# Patient Record
Sex: FEMALE | Race: WHITE
[De-identification: names, ages, dates, MRNs, and addresses within clinical notes are randomized per-mention and may not be internally consistent; named-entity substitution may affect disease eponyms.]

---

## 2018-01-05 ENCOUNTER — HOSPITAL ENCOUNTER (OUTPATIENT)
Dept: HOSPITAL 95 - ORSCSDS | Age: 73
Discharge: HOME | End: 2018-01-05
Attending: PODIATRIST
Payer: MEDICARE

## 2018-01-05 VITALS — WEIGHT: 179.61 LBS | BODY MASS INDEX: 28.87 KG/M2 | HEIGHT: 65.98 IN

## 2018-01-05 DIAGNOSIS — I48.91: ICD-10-CM

## 2018-01-05 DIAGNOSIS — I10: ICD-10-CM

## 2018-01-05 DIAGNOSIS — E03.9: ICD-10-CM

## 2018-01-05 DIAGNOSIS — Z96.9: ICD-10-CM

## 2018-01-05 DIAGNOSIS — Z79.899: ICD-10-CM

## 2018-01-05 DIAGNOSIS — Z87.891: ICD-10-CM

## 2018-01-05 DIAGNOSIS — M20.12: Primary | ICD-10-CM

## 2018-01-05 DIAGNOSIS — N18.9: ICD-10-CM

## 2018-01-05 PROCEDURE — 0QSP04Z REPOSITION LEFT METATARSAL WITH INTERNAL FIXATION DEVICE, OPEN APPROACH: ICD-10-PCS | Performed by: PODIATRIST

## 2018-01-05 PROCEDURE — C1713 ANCHOR/SCREW BN/BN,TIS/BN: HCPCS

## 2018-01-05 PROCEDURE — 0QPP04Z REMOVAL OF INTERNAL FIXATION DEVICE FROM LEFT METATARSAL, OPEN APPROACH: ICD-10-PCS | Performed by: PODIATRIST

## 2018-01-05 PROCEDURE — C1769 GUIDE WIRE: HCPCS

## 2018-06-11 ENCOUNTER — HOSPITAL ENCOUNTER (OUTPATIENT)
Dept: HOSPITAL 95 - LAB SHORT | Age: 73
Discharge: HOME | End: 2018-06-11
Attending: SPECIALIST
Payer: MEDICARE

## 2018-06-11 DIAGNOSIS — R94.5: ICD-10-CM

## 2018-06-11 DIAGNOSIS — E55.9: ICD-10-CM

## 2018-06-11 DIAGNOSIS — E78.00: ICD-10-CM

## 2018-06-11 DIAGNOSIS — R76.9: ICD-10-CM

## 2018-06-11 DIAGNOSIS — D50.9: ICD-10-CM

## 2018-06-11 DIAGNOSIS — R94.6: ICD-10-CM

## 2018-06-11 DIAGNOSIS — D63.1: ICD-10-CM

## 2018-06-11 DIAGNOSIS — D51.8: ICD-10-CM

## 2018-06-11 DIAGNOSIS — N25.81: ICD-10-CM

## 2018-06-11 DIAGNOSIS — D52.8: ICD-10-CM

## 2018-06-11 DIAGNOSIS — N18.3: Primary | ICD-10-CM

## 2018-06-11 LAB
MICROALBUMIN 24H UR-MCNC: 13.2 MG/L (ref 0–20)
PROT UR-MCNC: 9.6 MG/DL (ref 0–11.9)

## 2021-05-11 ENCOUNTER — HOSPITAL ENCOUNTER (INPATIENT)
Dept: HOSPITAL 95 - ER | Age: 76
LOS: 2 days | Discharge: HOME | DRG: 872 | End: 2021-05-13
Attending: HOSPITALIST | Admitting: HOSPITALIST
Payer: MEDICARE

## 2021-05-11 VITALS — BODY MASS INDEX: 28.12 KG/M2 | HEIGHT: 66 IN | WEIGHT: 175 LBS

## 2021-05-11 DIAGNOSIS — Z98.890: ICD-10-CM

## 2021-05-11 DIAGNOSIS — Z79.899: ICD-10-CM

## 2021-05-11 DIAGNOSIS — Z90.710: ICD-10-CM

## 2021-05-11 DIAGNOSIS — I48.20: ICD-10-CM

## 2021-05-11 DIAGNOSIS — L03.114: ICD-10-CM

## 2021-05-11 DIAGNOSIS — Z88.8: ICD-10-CM

## 2021-05-11 DIAGNOSIS — K21.9: ICD-10-CM

## 2021-05-11 DIAGNOSIS — N18.30: ICD-10-CM

## 2021-05-11 DIAGNOSIS — M17.0: ICD-10-CM

## 2021-05-11 DIAGNOSIS — A41.9: Primary | ICD-10-CM

## 2021-05-11 DIAGNOSIS — Z90.89: ICD-10-CM

## 2021-05-11 DIAGNOSIS — E03.9: ICD-10-CM

## 2021-05-11 DIAGNOSIS — Z79.01: ICD-10-CM

## 2021-05-11 DIAGNOSIS — Z87.11: ICD-10-CM

## 2021-05-11 DIAGNOSIS — M11.232: ICD-10-CM

## 2021-05-11 LAB
ALBUMIN SERPL BCP-MCNC: 3.5 G/DL (ref 3.4–5)
ALBUMIN/GLOB SERPL: 0.8 {RATIO} (ref 0.8–1.8)
ALT SERPL W P-5'-P-CCNC: 26 U/L (ref 12–78)
ANION GAP SERPL CALCULATED.4IONS-SCNC: 6 MMOL/L (ref 6–16)
AST SERPL W P-5'-P-CCNC: 15 U/L (ref 12–37)
BASOPHILS # BLD AUTO: 0.04 K/MM3 (ref 0–0.23)
BASOPHILS NFR BLD AUTO: 0 % (ref 0–2)
BILIRUB SERPL-MCNC: 0.9 MG/DL (ref 0.1–1)
BUN SERPL-MCNC: 14 MG/DL (ref 8–24)
CALCIUM SERPL-MCNC: 9.2 MG/DL (ref 8.5–10.1)
CHLORIDE SERPL-SCNC: 105 MMOL/L (ref 98–108)
CO2 SERPL-SCNC: 28 MMOL/L (ref 21–32)
CREAT SERPL-MCNC: 1.15 MG/DL (ref 0.4–1)
CRP SERPL-MCNC: 13.1 MG/DL (ref 0–0.3)
DEPRECATED RDW RBC AUTO: 44.4 FL (ref 35.1–46.3)
EOSINOPHIL # BLD AUTO: 0.17 K/MM3 (ref 0–0.68)
EOSINOPHIL NFR BLD AUTO: 2 % (ref 0–6)
ERYTHROCYTE [DISTWIDTH] IN BLOOD BY AUTOMATED COUNT: 15.7 % (ref 11.7–14.2)
GLOBULIN SER CALC-MCNC: 4.4 G/DL (ref 2.2–4)
GLUCOSE SERPL-MCNC: 133 MG/DL (ref 70–99)
HCT VFR BLD AUTO: 39.9 % (ref 33–51)
HGB BLD-MCNC: 13.5 G/DL (ref 11.5–16)
IMM GRANULOCYTES # BLD AUTO: 0.05 K/MM3 (ref 0–0.1)
IMM GRANULOCYTES NFR BLD AUTO: 1 % (ref 0–1)
LEUKOCYTE ESTERASE UR QL STRIP: (no result)
LYMPHOCYTES # BLD AUTO: 1.61 K/MM3 (ref 0.84–5.2)
LYMPHOCYTES NFR BLD AUTO: 17 % (ref 21–46)
MCHC RBC AUTO-ENTMCNC: 33.8 G/DL (ref 31.5–36.5)
MCV RBC AUTO: 82 FL (ref 80–100)
MONOCYTES # BLD AUTO: 1.27 K/MM3 (ref 0.16–1.47)
MONOCYTES NFR BLD AUTO: 13 % (ref 4–13)
NEUTROPHILS # BLD AUTO: 6.59 K/MM3 (ref 1.96–9.15)
NEUTROPHILS NFR BLD AUTO: 68 % (ref 41–73)
NRBC # BLD AUTO: 0 K/MM3 (ref 0–0.02)
NRBC BLD AUTO-RTO: 0 /100 WBC (ref 0–0.2)
PLATELET # BLD AUTO: 162 K/MM3 (ref 150–400)
POTASSIUM SERPL-SCNC: 3.6 MMOL/L (ref 3.5–5.5)
PROT SERPL-MCNC: 7.9 G/DL (ref 6.4–8.2)
PROT UR STRIP-MCNC: (no result) MG/DL
RBC #/AREA URNS HPF: (no result) /HPF (ref 0–2)
SODIUM SERPL-SCNC: 139 MMOL/L (ref 136–145)
SP GR SPEC: 1.02 (ref 1–1.02)
UROBILINOGEN UR STRIP-MCNC: (no result) MG/DL
WBC #/AREA URNS HPF: (no result) /HPF (ref 0–5)

## 2021-05-11 PROCEDURE — A9270 NON-COVERED ITEM OR SERVICE: HCPCS

## 2021-05-12 NOTE — NUR
SHIFT SUMMARY
PT A&OX4, VSS, L WRIST CELLULITIS, RED/WARM/EDEMA IMPROVED, WIGGLES FINGERS,
ABLE TO MAKE A FIST NOW, DENIES N&T IN ALL EXT'S. DENIES NEED FOR PAIN
MEDICATIONS. JAVID PO. AMBULATING IND IN ROOM, TO BRP. VOIDING WELL. REPORT
PROVIDED TO CAT RN.

## 2021-05-12 NOTE — NUR
SHIFT SUMMARY
PT CAME IN FROM ED AT 0300 THIS MORNING. ALERT AND ORIENTED 3-4 WITH DAUGHTER
YARA AT BEDSIDE. PT IS ADMITTED FOR L WRIST CELLULITIS WITH REDNESS, WARM TO
TOUCH AND SWOLLEN. PT CAME IN WITH VANCOMYCIN INFUSING, STARTED TO FEEL WARM
AND REDNESS ON FACE APPEARED. LIDA DAWSON KEITH DR. BECERRA FOR BENADRYL.
ADMINSTERED BENADRYL VIA IV. 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 PT HAS IV ON R
HAND, SALINE LOCKED. IV ABX ADMINISTERED. PT REPORTS NO APPETITE IN THE PAST 2
DAYS. DENIES NAUSEA AND VOMITING. TOLERATING PO. HX AFIB. PT ON TELE, AFIB AT
96 PER [employer] TECH. PT DENIES NUMBNESS AND TINGLING SENSATION BUT HAD A
HARD TIME MOVING LEFT SIDE FINGERS. PT SLEPT GOOD AFTER THE INITIAL
ASSESSMENT. PT REPORTS PAIN, MEDICATED WITH TYLENOL. CALL LIGHT WITHIN REACH.
WILL PROVIDE REPORT TO RN NURSE.

## 2021-05-13 LAB
ALBUMIN SERPL BCP-MCNC: 2.8 G/DL (ref 3.4–5)
ANION GAP SERPL CALCULATED.4IONS-SCNC: 7 MMOL/L (ref 6–16)
BASOPHILS # BLD AUTO: 0.05 K/MM3 (ref 0–0.23)
BASOPHILS NFR BLD AUTO: 1 % (ref 0–2)
BUN SERPL-MCNC: 22 MG/DL (ref 8–24)
CALCIUM SERPL-MCNC: 8.8 MG/DL (ref 8.5–10.1)
CHLORIDE SERPL-SCNC: 106 MMOL/L (ref 98–108)
CO2 SERPL-SCNC: 28 MMOL/L (ref 21–32)
CREAT SERPL-MCNC: 1.21 MG/DL (ref 0.4–1)
DEPRECATED RDW RBC AUTO: 44.3 FL (ref 35.1–46.3)
EOSINOPHIL # BLD AUTO: 0.25 K/MM3 (ref 0–0.68)
EOSINOPHIL NFR BLD AUTO: 4 % (ref 0–6)
ERYTHROCYTE [DISTWIDTH] IN BLOOD BY AUTOMATED COUNT: 15.4 % (ref 11.7–14.2)
GLUCOSE SERPL-MCNC: 129 MG/DL (ref 70–99)
HCT VFR BLD AUTO: 35.8 % (ref 33–51)
HGB BLD-MCNC: 12.1 G/DL (ref 11.5–16)
IMM GRANULOCYTES # BLD AUTO: 0.04 K/MM3 (ref 0–0.1)
IMM GRANULOCYTES NFR BLD AUTO: 1 % (ref 0–1)
LYMPHOCYTES # BLD AUTO: 1.4 K/MM3 (ref 0.84–5.2)
LYMPHOCYTES NFR BLD AUTO: 21 % (ref 21–46)
MCHC RBC AUTO-ENTMCNC: 33.8 G/DL (ref 31.5–36.5)
MCV RBC AUTO: 83 FL (ref 80–100)
MONOCYTES # BLD AUTO: 0.73 K/MM3 (ref 0.16–1.47)
MONOCYTES NFR BLD AUTO: 11 % (ref 4–13)
NEUTROPHILS # BLD AUTO: 4.36 K/MM3 (ref 1.96–9.15)
NEUTROPHILS NFR BLD AUTO: 64 % (ref 41–73)
NRBC # BLD AUTO: 0 K/MM3 (ref 0–0.02)
NRBC BLD AUTO-RTO: 0 /100 WBC (ref 0–0.2)
PHOSPHATE SERPL-MCNC: 4 MG/DL (ref 2.5–4.9)
PLATELET # BLD AUTO: 145 K/MM3 (ref 150–400)
POTASSIUM SERPL-SCNC: 3.3 MMOL/L (ref 3.5–5.5)
SODIUM SERPL-SCNC: 141 MMOL/L (ref 136–145)

## 2021-05-13 NOTE — NUR
SHIFT SUMMARY
PT AOX4. ADMITTED FOR CELLULITS ON LEFT WRIST. L WRIST REDNESS AND SWELLING IS
IMPROVING. PT REPORTS MIN PAIN. PAIN MANAGED WITH TYLENOL. ABX ADMINISTERED
X3. VOIDING ADEQUATELY DENIES ANY ISSUES BUT HAS BEEN HAVING DARKER YELLOW
URINE. ENC PATIENT TO DRINK MORE FLUIDS. PT TOLERATION PO INTAKE. DENIES
NAUSEA AND VOMITING. INDEPENDET IN ROOM. CALL LIGHT WITHIN REACH. WILL PROVIDE
REPORT TO ONCOMING NURSE.

## 2021-05-13 NOTE — NUR
DISCHARGE SUMMARY
PT ALERT AND ORIENTED THROUGHOUT MORNING SHIFT. TOLERATIG REGULAR DIET.
INDEPENDENT IN THE ROOM. VOIDING WELL. SWELLING AND REDNESS TO LEFT HAND AND
WRIST GREATLY DIMINISHED. DENIES PAIN. DISCHARGE ORDERS OBTAINED. IV DC'D WNL.
PT TOLERATED WELL. TELE DC'D. DISCHARGE EDUCATION GIVEN ON FOLLOW UP
PRECAUTIONS, FOLLOW UP APPOINTMENTS, AND DISCHARGE MEDS. PT LEFT UNIT VIA
WHEELCHAIR FOR HOME AT 1250.

## 2021-05-24 ENCOUNTER — HOSPITAL ENCOUNTER (EMERGENCY)
Dept: HOSPITAL 95 - ER | Age: 76
Discharge: HOME | End: 2021-05-24
Payer: MEDICARE

## 2021-05-24 VITALS — BODY MASS INDEX: 27.32 KG/M2 | HEIGHT: 66 IN | WEIGHT: 170 LBS

## 2021-05-24 DIAGNOSIS — Z88.8: ICD-10-CM

## 2021-05-24 DIAGNOSIS — Z79.01: ICD-10-CM

## 2021-05-24 DIAGNOSIS — Z88.1: ICD-10-CM

## 2021-05-24 DIAGNOSIS — I48.91: ICD-10-CM

## 2021-05-24 DIAGNOSIS — L03.114: Primary | ICD-10-CM

## 2021-05-24 DIAGNOSIS — Z87.891: ICD-10-CM

## 2021-10-18 ENCOUNTER — HOSPITAL ENCOUNTER (OUTPATIENT)
Dept: HOSPITAL 95 - ORSCSDS | Age: 76
Discharge: HOME | End: 2021-10-18
Attending: ORTHOPAEDIC SURGERY
Payer: MEDICARE

## 2021-10-18 ENCOUNTER — HOSPITAL ENCOUNTER (OUTPATIENT)
Dept: HOSPITAL 95 - PCU | Age: 76
Setting detail: OBSERVATION
LOS: 1 days | Discharge: HOME | End: 2021-10-19
Attending: INTERNAL MEDICINE | Admitting: NURSE PRACTITIONER
Payer: MEDICARE

## 2021-10-18 VITALS — HEIGHT: 66 IN | WEIGHT: 183.87 LBS | BODY MASS INDEX: 29.55 KG/M2

## 2021-10-18 VITALS — HEIGHT: 66 IN | BODY MASS INDEX: 10.1 KG/M2 | WEIGHT: 62.83 LBS

## 2021-10-18 DIAGNOSIS — E03.9: ICD-10-CM

## 2021-10-18 DIAGNOSIS — Z79.899: ICD-10-CM

## 2021-10-18 DIAGNOSIS — Z79.01: ICD-10-CM

## 2021-10-18 DIAGNOSIS — I12.9: ICD-10-CM
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
## 2021-10-18 DIAGNOSIS — I10: ICD-10-CM

## 2021-10-18 DIAGNOSIS — Z88.1: ICD-10-CM

## 2021-10-18 DIAGNOSIS — E78.1: ICD-10-CM

## 2021-10-18 DIAGNOSIS — K21.9: ICD-10-CM

## 2021-10-18 DIAGNOSIS — Z87.891: ICD-10-CM

## 2021-10-18 DIAGNOSIS — I97.191: Primary | ICD-10-CM

## 2021-10-18 DIAGNOSIS — N18.30: ICD-10-CM

## 2021-10-18 DIAGNOSIS — E89.0: ICD-10-CM

## 2021-10-18 DIAGNOSIS — E66.01: ICD-10-CM

## 2021-10-18 DIAGNOSIS — Z88.8: ICD-10-CM

## 2021-10-18 DIAGNOSIS — M18.12: Primary | ICD-10-CM

## 2021-10-18 DIAGNOSIS — I48.91: ICD-10-CM

## 2021-10-18 DIAGNOSIS — I48.20: ICD-10-CM

## 2021-10-18 LAB
ALBUMIN SERPL BCP-MCNC: 3.3 G/DL (ref 3.4–5)
ALBUMIN/GLOB SERPL: 0.9 {RATIO} (ref 0.8–1.8)
ALT SERPL W P-5'-P-CCNC: 41 U/L (ref 12–78)
ANION GAP SERPL CALCULATED.4IONS-SCNC: 8 MMOL/L (ref 6–16)
AST SERPL W P-5'-P-CCNC: 32 U/L (ref 12–37)
BASOPHILS # BLD AUTO: 0.05 K/MM3 (ref 0–0.23)
BASOPHILS NFR BLD AUTO: 1 % (ref 0–2)
BILIRUB SERPL-MCNC: 0.3 MG/DL (ref 0.1–1)
BUN SERPL-MCNC: 20 MG/DL (ref 8–24)
CALCIUM SERPL-MCNC: 8.6 MG/DL (ref 8.5–10.1)
CHLORIDE SERPL-SCNC: 104 MMOL/L (ref 98–108)
CO2 SERPL-SCNC: 28 MMOL/L (ref 21–32)
CREAT SERPL-MCNC: 1.04 MG/DL (ref 0.4–1)
DEPRECATED RDW RBC AUTO: 42.6 FL (ref 35.1–46.3)
EOSINOPHIL # BLD AUTO: 0.08 K/MM3 (ref 0–0.68)
EOSINOPHIL NFR BLD AUTO: 1 % (ref 0–6)
ERYTHROCYTE [DISTWIDTH] IN BLOOD BY AUTOMATED COUNT: 13.5 % (ref 11.7–14.2)
GLOBULIN SER CALC-MCNC: 3.8 G/DL (ref 2.2–4)
GLUCOSE SERPL-MCNC: 161 MG/DL (ref 70–99)
HCT VFR BLD AUTO: 39 % (ref 33–51)
HGB BLD-MCNC: 13.1 G/DL (ref 11.5–16)
IMM GRANULOCYTES # BLD AUTO: 0.07 K/MM3 (ref 0–0.1)
IMM GRANULOCYTES NFR BLD AUTO: 1 % (ref 0–1)
LYMPHOCYTES # BLD AUTO: 1.28 K/MM3 (ref 0.84–5.2)
LYMPHOCYTES NFR BLD AUTO: 15 % (ref 21–46)
MAGNESIUM SERPL-MCNC: 1.7 MG/DL (ref 1.6–2.4)
MCHC RBC AUTO-ENTMCNC: 33.6 G/DL (ref 31.5–36.5)
MCV RBC AUTO: 86 FL (ref 80–100)
MONOCYTES # BLD AUTO: 0.31 K/MM3 (ref 0.16–1.47)
MONOCYTES NFR BLD AUTO: 4 % (ref 4–13)
NEUTROPHILS # BLD AUTO: 6.67 K/MM3 (ref 1.96–9.15)
NEUTROPHILS NFR BLD AUTO: 79 % (ref 41–73)
NRBC # BLD AUTO: 0 K/MM3 (ref 0–0.02)
NRBC BLD AUTO-RTO: 0 /100 WBC (ref 0–0.2)
PLATELET # BLD AUTO: 160 K/MM3 (ref 150–400)
POTASSIUM SERPL-SCNC: 3.4 MMOL/L (ref 3.5–5.5)
PROT SERPL-MCNC: 7.1 G/DL (ref 6.4–8.2)
SODIUM SERPL-SCNC: 140 MMOL/L (ref 136–145)
TROPONIN I SERPL-MCNC: <0.015 NG/ML (ref 0–0.04)

## 2021-10-18 PROCEDURE — G0378 HOSPITAL OBSERVATION PER HR: HCPCS

## 2021-10-18 PROCEDURE — 0RQT0ZZ REPAIR LEFT CARPOMETACARPAL JOINT, OPEN APPROACH: ICD-10-PCS | Performed by: ORTHOPAEDIC SURGERY

## 2021-10-18 PROCEDURE — 0LX80ZZ TRANSFER LEFT HAND TENDON, OPEN APPROACH: ICD-10-PCS | Performed by: ORTHOPAEDIC SURGERY

## 2021-10-18 PROCEDURE — A9270 NON-COVERED ITEM OR SERVICE: HCPCS

## 2021-10-18 NOTE — NUR
10/18/21 1158 Chico Hinds AT BEDSIDE ORDERING FOR CARDIOLOGY TO BE CALLED AND HAVE
CONSULT OR APPOINTMENT TO BE MADE ASAP OR WITHIN A WEEK DUE TO
TACHYCARDIA.  PATIENT PUT ONTO 3 LEAD SHOWING SINUS TACHYCARDIA
RANGING FROM 135-159 BPM WITH ANESTHESIA INFORMED AND OBSERVING VS AND
LEAD RYTHM.  PAR STAFF INFORMED TO KEEP PATIENT IN PAR FOR
OBERSERVATION UNTIL HEART RATE STAYS CONSISTENTLY UNDER 100BPM.

## 2021-10-18 NOTE — NUR
CARE ASSUMPTION/ARRIVAL TO PCU
 
PATIENT ARRIVED FROM SURGERY CENTER VIA GURNEY AND TRANSFERED TO PCU BED VIA
SLIDER SHEET. ARRIVED ON 10L NRB, AND WE SWITCHED TO 2L NC AND PATIENT O2 SATS
>90%. TELE AFIB . VSS. SWITCHED TO RA AND SPO2 >90%. LUNG SOUNDS CLEAR.
NO CHEST PAIN OR SOB. THUMB PAIN RATED AT 3. ICE PROVIDED AND ELEVATED. CALL
LIGHT WITHIN REACH AND BED IN LOWEST POSITION. WILL CONTINUE TO MONITOR AND
PROVIDE CARE.

## 2021-10-18 NOTE — NUR
SHIFT SUMMARY
 
PATIENT A.OX4. VSS. AFIB . SPO2 >90% ON 2L NC. PATIENT STARTED TO DESAT
WHEN SLEEPING SO PUT O2 BACK ON. CARDIZEM INFUSING AT A RATE OF 5. NO ACUTE
CHANGES. WILL CONTINUE TO MONITOR AND PROVIDE CARE UNTIL HAND OFF WITH NEXT
SHIFT.

## 2021-10-18 NOTE — NUR
10/18/21 1218 Chico Hinds 10MG GIVEN BY MOUTH PER  FOR TACHYCARDIA.  SAMPLE
OF MEDICATION RECEIVED BY [facility].  THIS IS PATIENTS HOME
REGIMEN DOSE PER PT.

## 2021-10-19 LAB
ALBUMIN SERPL BCP-MCNC: 2.9 G/DL (ref 3.4–5)
ALBUMIN/GLOB SERPL: 0.8 {RATIO} (ref 0.8–1.8)
ALT SERPL W P-5'-P-CCNC: 35 U/L (ref 12–78)
ANION GAP SERPL CALCULATED.4IONS-SCNC: 8 MMOL/L (ref 6–16)
AST SERPL W P-5'-P-CCNC: 24 U/L (ref 12–37)
BASOPHILS # BLD AUTO: 0.03 K/MM3 (ref 0–0.23)
BASOPHILS NFR BLD AUTO: 0 % (ref 0–2)
BILIRUB SERPL-MCNC: 0.4 MG/DL (ref 0.1–1)
BUN SERPL-MCNC: 29 MG/DL (ref 8–24)
CALCIUM SERPL-MCNC: 8.5 MG/DL (ref 8.5–10.1)
CHLORIDE SERPL-SCNC: 103 MMOL/L (ref 98–108)
CO2 SERPL-SCNC: 28 MMOL/L (ref 21–32)
CREAT SERPL-MCNC: 1.18 MG/DL (ref 0.4–1)
DEPRECATED RDW RBC AUTO: 42.7 FL (ref 35.1–46.3)
EOSINOPHIL # BLD AUTO: 0 K/MM3 (ref 0–0.68)
EOSINOPHIL NFR BLD AUTO: 0 % (ref 0–6)
ERYTHROCYTE [DISTWIDTH] IN BLOOD BY AUTOMATED COUNT: 13.8 % (ref 11.7–14.2)
GLOBULIN SER CALC-MCNC: 3.6 G/DL (ref 2.2–4)
GLUCOSE SERPL-MCNC: 184 MG/DL (ref 70–99)
HCT VFR BLD AUTO: 34.8 % (ref 33–51)
HGB BLD-MCNC: 11.8 G/DL (ref 11.5–16)
IMM GRANULOCYTES # BLD AUTO: 0.17 K/MM3 (ref 0–0.1)
IMM GRANULOCYTES NFR BLD AUTO: 1 % (ref 0–1)
LYMPHOCYTES # BLD AUTO: 1.26 K/MM3 (ref 0.84–5.2)
LYMPHOCYTES NFR BLD AUTO: 9 % (ref 21–46)
MCHC RBC AUTO-ENTMCNC: 33.9 G/DL (ref 31.5–36.5)
MCV RBC AUTO: 86 FL (ref 80–100)
MONOCYTES # BLD AUTO: 1.02 K/MM3 (ref 0.16–1.47)
MONOCYTES NFR BLD AUTO: 8 % (ref 4–13)
NEUTROPHILS # BLD AUTO: 11.01 K/MM3 (ref 1.96–9.15)
NEUTROPHILS NFR BLD AUTO: 82 % (ref 41–73)
NRBC # BLD AUTO: 0 K/MM3 (ref 0–0.02)
NRBC BLD AUTO-RTO: 0 /100 WBC (ref 0–0.2)
PLATELET # BLD AUTO: 152 K/MM3 (ref 150–400)
POTASSIUM SERPL-SCNC: 3.6 MMOL/L (ref 3.5–5.5)
PROT SERPL-MCNC: 6.5 G/DL (ref 6.4–8.2)
SODIUM SERPL-SCNC: 139 MMOL/L (ref 136–145)
TROPONIN I SERPL-MCNC: <0.015 NG/ML (ref 0–0.04)

## 2021-10-19 NOTE — NUR
SHIFT SUMMARY
 
PT A/OX4, APPROPRIATE WITH STAFF. ON TELE AFIB 90'S-100'S. ON 1-2L NC TO
MAINTAIN O2 ABOVE 90% WHEN SLEEPING DOES DESAT. HAS HER LEFT ARM ON A SLING,
WITH ACE WRAP WITH NO DRAINAGE NOTED. ELEVATED LEFT ARM ON PILLOWS AND PUT ICE
BAG AROUND ARM. BUE AND BLE SKIN WARM T/O TO TOUCH. ABLE TO MOVE FINGERS ON
BOTH HANDS. PT DID HAVE A LOW GRADE FEVER AND SOMCE DISCOMFORT AND MEDICATED
PER EMAR/ORDERS. VSS. PT WAS HAVING SOME ACID RELUX; HOSPITALIST NOTIFIED AND
ORDERS WERE GIVEN; PT MORE COMFORTABLE. PT CONT. TO BEDSIDE COMMODE WITH ONE
PERSON ASSIST. BED IN LOWEST POSITION, CALL LIGHT W/I REACH, AND BED ALARM ON.
GAVE REPORT TO NELLI NG RN.

## 2021-10-19 NOTE — NUR
PT DISCHARGED TO HOME WITH DISCHARGE ORDERS PT DISCLOSED POST OP INSTRUCTIONS
FROM YESTERDAY'S DC PAPPER FROM DAY SURGERY. PT VERBALIZED UNDERSTANDING. PT
TO CONTINUE SAME HOME MEDS REGIMEN. PT SENT HOME WITH PAIN MEDS HARD SCRIPT.
DRESSING ON LEFT ARM INTACT WITH SLING ON. NO OTHER ISSUES REPORTED PRIOR TO
DISCHARGE. PT ACCOMPANIED BY NATIONAL GUARD AT THE ER ENTRANCE FOR TRANSPORT,
ALL BELONGINGS SENT WITH THE PT.

## 2021-10-19 NOTE — NUR
CARE ASSUMPTION
 
PATIENT A/OX4. VSS. SPO2 >90% ON RA. TELE AFIB 70-90S. NO CHEST PAIN, PAIN, OR
SOB. MD IN TO SEE PATIENT AND PATIENT WILL DISCHARGE TODAY. CALL LIGHT WITHIN
REACH. WILL CONTINUE TO MONITOR AND PROVIDE CARE.

## 2021-11-04 ENCOUNTER — HOSPITAL ENCOUNTER (OUTPATIENT)
Dept: HOSPITAL 95 - ORSCSDS | Age: 76
Discharge: HOME | End: 2021-11-04
Attending: INTERNAL MEDICINE
Payer: MEDICARE

## 2021-11-04 VITALS — BODY MASS INDEX: 28.13 KG/M2 | HEIGHT: 66 IN | WEIGHT: 175.05 LBS

## 2021-11-04 DIAGNOSIS — K44.9: ICD-10-CM

## 2021-11-04 DIAGNOSIS — R19.5: ICD-10-CM

## 2021-11-04 DIAGNOSIS — D50.0: Primary | ICD-10-CM

## 2021-11-04 DIAGNOSIS — K76.0: ICD-10-CM

## 2021-11-04 DIAGNOSIS — Z86.010: ICD-10-CM

## 2021-11-04 DIAGNOSIS — Z87.891: ICD-10-CM

## 2021-11-04 DIAGNOSIS — K64.8: ICD-10-CM

## 2021-11-04 DIAGNOSIS — E03.9: ICD-10-CM

## 2021-11-04 DIAGNOSIS — I48.91: ICD-10-CM

## 2021-11-04 DIAGNOSIS — Z79.01: ICD-10-CM

## 2021-11-04 DIAGNOSIS — K31.7: ICD-10-CM

## 2021-11-04 DIAGNOSIS — K57.30: ICD-10-CM

## 2021-11-04 DIAGNOSIS — Z79.899: ICD-10-CM

## 2021-11-04 NOTE — NUR
11/04/21 1445 Toya Molina (Rosa
DELAYED ENTRY
 
ONCE IN RECOVERY, PT DESATURATED TO 88% ON ROOM AIR. WHEN INSTRUCTED
TO DEEP BREATHE, 02 INCREASED TO 90-94%. UNLESS PT WAS CONSCIOUSLY
DEEP BREATHING, OXYGEN WOULD DROP TO 88%. PT CONTINUES TO DENY ANY
DISCOMFORT, DIFFICULTY BREATHING OR NEED TO CLEAR LUNGS. LUNGS CLEAR
TO AUSCULTATION, PT DOES NOT APPEAR IN DISTRESS, NO LABORED BREATHING.
DR. PACE NOTIFIED OF DECREASED O2 SAT.
 
1255: DR. PACE CALLED PT'S PCP
1300: DR. PACE CALLED ED REGARDING POSSIBLE TX FOR CHEST XRAY
 
PT ADAMANTLY DOES NOT WANT TO REPORT TO ED; STS SHE "LIVES LIKE THIS"
AND "FEELS FINE". DR. PACE CLEARED PT FOR PO INTAKE. PT TOLERATING
JUICE WITHOUT ISSUES. PT O2 GRADUALLY INCREASING WITHOUT FOCUSED DEEP
BREATHING, SATURATION NOW RANGING 93-96% ON ROOM AIR. PT & DR. PACE
IN AGREEMENT PT IS OKAY TO DISCHARGE HOME.
 
1325: DR. PACE CLEARED PT TO DISCHARGE HOME ON ROOM AIR.

## 2022-12-01 ENCOUNTER — HOSPITAL ENCOUNTER (OUTPATIENT)
Dept: HOSPITAL 95 - LAB | Age: 77
End: 2022-12-01
Attending: OTOLARYNGOLOGY
Payer: MEDICARE

## 2022-12-01 DIAGNOSIS — L72.0: Primary | ICD-10-CM

## 2022-12-01 DIAGNOSIS — L57.8: ICD-10-CM

## 2023-07-19 ENCOUNTER — HOSPITAL ENCOUNTER (INPATIENT)
Dept: HOSPITAL 95 - ER | Age: 78
LOS: 6 days | Discharge: HOME | DRG: 871 | End: 2023-07-25
Attending: HOSPITALIST | Admitting: FAMILY MEDICINE
Payer: MEDICARE

## 2023-07-19 VITALS — SYSTOLIC BLOOD PRESSURE: 129 MMHG | DIASTOLIC BLOOD PRESSURE: 74 MMHG

## 2023-07-19 VITALS — SYSTOLIC BLOOD PRESSURE: 126 MMHG | DIASTOLIC BLOOD PRESSURE: 73 MMHG

## 2023-07-19 VITALS — HEIGHT: 66 IN | WEIGHT: 175.27 LBS | BODY MASS INDEX: 28.17 KG/M2

## 2023-07-19 DIAGNOSIS — K76.0: ICD-10-CM

## 2023-07-19 DIAGNOSIS — Z79.899: ICD-10-CM

## 2023-07-19 DIAGNOSIS — Z79.891: ICD-10-CM

## 2023-07-19 DIAGNOSIS — K21.9: ICD-10-CM

## 2023-07-19 DIAGNOSIS — Z88.1: ICD-10-CM

## 2023-07-19 DIAGNOSIS — Z79.01: ICD-10-CM

## 2023-07-19 DIAGNOSIS — A41.59: Primary | ICD-10-CM

## 2023-07-19 DIAGNOSIS — I48.91: ICD-10-CM

## 2023-07-19 DIAGNOSIS — D63.1: ICD-10-CM

## 2023-07-19 DIAGNOSIS — Z88.8: ICD-10-CM

## 2023-07-19 DIAGNOSIS — E89.0: ICD-10-CM

## 2023-07-19 DIAGNOSIS — K64.9: ICD-10-CM

## 2023-07-19 DIAGNOSIS — Z90.89: ICD-10-CM

## 2023-07-19 DIAGNOSIS — E83.42: ICD-10-CM

## 2023-07-19 DIAGNOSIS — D69.6: ICD-10-CM

## 2023-07-19 DIAGNOSIS — G92.8: ICD-10-CM

## 2023-07-19 DIAGNOSIS — E78.1: ICD-10-CM

## 2023-07-19 DIAGNOSIS — Z90.710: ICD-10-CM

## 2023-07-19 DIAGNOSIS — E87.6: ICD-10-CM

## 2023-07-19 DIAGNOSIS — M19.90: ICD-10-CM

## 2023-07-19 DIAGNOSIS — E80.6: ICD-10-CM

## 2023-07-19 DIAGNOSIS — I10: ICD-10-CM

## 2023-07-19 DIAGNOSIS — Z79.890: ICD-10-CM

## 2023-07-19 DIAGNOSIS — R74.01: ICD-10-CM

## 2023-07-19 DIAGNOSIS — Z98.890: ICD-10-CM

## 2023-07-19 DIAGNOSIS — K44.9: ICD-10-CM

## 2023-07-19 DIAGNOSIS — E87.20: ICD-10-CM

## 2023-07-19 DIAGNOSIS — K83.09: ICD-10-CM

## 2023-07-19 DIAGNOSIS — M10.9: ICD-10-CM

## 2023-07-19 DIAGNOSIS — Z90.49: ICD-10-CM

## 2023-07-19 DIAGNOSIS — K57.30: ICD-10-CM

## 2023-07-19 LAB
ALBUMIN SERPL BCP-MCNC: 3.4 G/DL (ref 3.4–5)
ALBUMIN/GLOB SERPL: 0.9 {RATIO} (ref 0.8–1.8)
ALT SERPL W P-5'-P-CCNC: 566 U/L (ref 12–78)
ANION GAP SERPL CALCULATED.4IONS-SCNC: 14 MMOL/L (ref 6–16)
AST SERPL W P-5'-P-CCNC: 761 U/L (ref 12–37)
BASOPHILS # BLD AUTO: 0.04 K/MM3 (ref 0–0.23)
BASOPHILS NFR BLD AUTO: 0 % (ref 0–2)
BILIRUB SERPL-MCNC: 4.1 MG/DL (ref 0.1–1)
BUN SERPL-MCNC: 20 MG/DL (ref 8–24)
CALCIUM SERPL-MCNC: 9.1 MG/DL (ref 8.5–10.1)
CHLORIDE SERPL-SCNC: 108 MMOL/L (ref 98–108)
CO2 SERPL-SCNC: 21 MMOL/L (ref 21–32)
CREAT SERPL-MCNC: 0.98 MG/DL (ref 0.4–1)
DEPRECATED RDW RBC AUTO: 44.8 FL (ref 35.1–46.3)
EOSINOPHIL # BLD AUTO: 0.01 K/MM3 (ref 0–0.68)
EOSINOPHIL NFR BLD AUTO: 0 % (ref 0–6)
ERYTHROCYTE [DISTWIDTH] IN BLOOD BY AUTOMATED COUNT: 14.8 % (ref 11.7–14.2)
GLOBULIN SER CALC-MCNC: 3.8 G/DL (ref 2.2–4)
GLUCOSE SERPL-MCNC: 170 MG/DL (ref 70–99)
HCT VFR BLD AUTO: 36.7 % (ref 33–51)
HGB BLD-MCNC: 12.4 G/DL (ref 11.5–16)
IMM GRANULOCYTES # BLD AUTO: 0.1 K/MM3 (ref 0–0.1)
IMM GRANULOCYTES NFR BLD AUTO: 1 % (ref 0–1)
LYMPHOCYTES # BLD AUTO: 0.4 K/MM3 (ref 0.84–5.2)
LYMPHOCYTES NFR BLD AUTO: 3 % (ref 21–46)
MAGNESIUM SERPL-MCNC: 1.4 MG/DL (ref 1.6–2.4)
MCHC RBC AUTO-ENTMCNC: 33.8 G/DL (ref 31.5–36.5)
MCV RBC AUTO: 83 FL (ref 80–100)
MONOCYTES # BLD AUTO: 0.87 K/MM3 (ref 0.16–1.47)
MONOCYTES NFR BLD AUTO: 7 % (ref 4–13)
NEUTROPHILS # BLD AUTO: 11.91 K/MM3 (ref 1.96–9.15)
NEUTROPHILS NFR BLD AUTO: 89 % (ref 41–73)
NRBC # BLD AUTO: 0 K/MM3 (ref 0–0.02)
NRBC BLD AUTO-RTO: 0 /100 WBC (ref 0–0.2)
PLATELET # BLD AUTO: 133 K/MM3 (ref 150–400)
POTASSIUM SERPL-SCNC: 3.1 MMOL/L (ref 3.5–5.5)
PROT SERPL-MCNC: 7.2 G/DL (ref 6.4–8.2)
PROT UR STRIP-MCNC: (no result) MG/DL
PROTHROMBIN TIME: 12.4 SEC (ref 9.7–11.5)
RBC #/AREA URNS HPF: (no result) /HPF (ref 0–2)
SODIUM SERPL-SCNC: 143 MMOL/L (ref 136–145)
SP GR SPEC: 1.01 (ref 1–1.02)
UROBILINOGEN UR STRIP-MCNC: (no result) MG/DL
WBC #/AREA URNS HPF: (no result) /HPF (ref 0–5)

## 2023-07-19 PROCEDURE — A9270 NON-COVERED ITEM OR SERVICE: HCPCS

## 2023-07-19 PROCEDURE — C9113 INJ PANTOPRAZOLE SODIUM, VIA: HCPCS

## 2023-07-20 VITALS — DIASTOLIC BLOOD PRESSURE: 79 MMHG | SYSTOLIC BLOOD PRESSURE: 151 MMHG

## 2023-07-20 VITALS — SYSTOLIC BLOOD PRESSURE: 133 MMHG | DIASTOLIC BLOOD PRESSURE: 82 MMHG

## 2023-07-20 VITALS — SYSTOLIC BLOOD PRESSURE: 140 MMHG | DIASTOLIC BLOOD PRESSURE: 88 MMHG

## 2023-07-20 VITALS — DIASTOLIC BLOOD PRESSURE: 76 MMHG | SYSTOLIC BLOOD PRESSURE: 124 MMHG

## 2023-07-20 VITALS — SYSTOLIC BLOOD PRESSURE: 110 MMHG | DIASTOLIC BLOOD PRESSURE: 80 MMHG

## 2023-07-20 VITALS — SYSTOLIC BLOOD PRESSURE: 131 MMHG | DIASTOLIC BLOOD PRESSURE: 81 MMHG

## 2023-07-20 VITALS — SYSTOLIC BLOOD PRESSURE: 142 MMHG | DIASTOLIC BLOOD PRESSURE: 81 MMHG

## 2023-07-20 LAB
ALBUMIN SERPL BCP-MCNC: 3.2 G/DL (ref 3.4–5)
ALBUMIN/GLOB SERPL: 0.9 {RATIO} (ref 0.8–1.8)
ALT SERPL W P-5'-P-CCNC: 492 U/L (ref 12–78)
ANION GAP SERPL CALCULATED.4IONS-SCNC: 10 MMOL/L (ref 6–16)
AST SERPL W P-5'-P-CCNC: 420 U/L (ref 12–37)
BASOPHILS # BLD AUTO: 0.03 K/MM3 (ref 0–0.23)
BASOPHILS NFR BLD AUTO: 0 % (ref 0–2)
BILIRUB SERPL-MCNC: 6.5 MG/DL (ref 0.1–1)
BUN SERPL-MCNC: 22 MG/DL (ref 8–24)
CALCIUM SERPL-MCNC: 8.7 MG/DL (ref 8.5–10.1)
CHLORIDE SERPL-SCNC: 112 MMOL/L (ref 98–108)
CO2 SERPL-SCNC: 23 MMOL/L (ref 21–32)
CREAT SERPL-MCNC: 1.21 MG/DL (ref 0.4–1)
DEPRECATED RDW RBC AUTO: 48.7 FL (ref 35.1–46.3)
EOSINOPHIL # BLD AUTO: 0.15 K/MM3 (ref 0–0.68)
EOSINOPHIL NFR BLD AUTO: 1 % (ref 0–6)
ERYTHROCYTE [DISTWIDTH] IN BLOOD BY AUTOMATED COUNT: 15.6 % (ref 11.7–14.2)
GLOBULIN SER CALC-MCNC: 3.7 G/DL (ref 2.2–4)
GLUCOSE SERPL-MCNC: 118 MG/DL (ref 70–99)
HCT VFR BLD AUTO: 36 % (ref 33–51)
HGB BLD-MCNC: 11.7 G/DL (ref 11.5–16)
IMM GRANULOCYTES # BLD AUTO: 0.09 K/MM3 (ref 0–0.1)
IMM GRANULOCYTES NFR BLD AUTO: 1 % (ref 0–1)
LYMPHOCYTES # BLD AUTO: 0.85 K/MM3 (ref 0.84–5.2)
LYMPHOCYTES NFR BLD AUTO: 8 % (ref 21–46)
MAGNESIUM SERPL-MCNC: 2.2 MG/DL (ref 1.6–2.4)
MCHC RBC AUTO-ENTMCNC: 32.5 G/DL (ref 31.5–36.5)
MCV RBC AUTO: 86 FL (ref 80–100)
MONOCYTES # BLD AUTO: 1.08 K/MM3 (ref 0.16–1.47)
MONOCYTES NFR BLD AUTO: 10 % (ref 4–13)
NEUTROPHILS # BLD AUTO: 9.15 K/MM3 (ref 1.96–9.15)
NEUTROPHILS NFR BLD AUTO: 81 % (ref 41–73)
NRBC # BLD AUTO: 0 K/MM3 (ref 0–0.02)
NRBC BLD AUTO-RTO: 0 /100 WBC (ref 0–0.2)
PLATELET # BLD AUTO: 106 K/MM3 (ref 150–400)
POTASSIUM SERPL-SCNC: 3.9 MMOL/L (ref 3.5–5.5)
PROT SERPL-MCNC: 6.9 G/DL (ref 6.4–8.2)
PROTHROMBIN TIME: 13.4 SEC (ref 9.7–11.5)
SODIUM SERPL-SCNC: 145 MMOL/L (ref 136–145)

## 2023-07-20 NOTE — NUR
Pt's heart rate has been trending up.  She also has increased temperature,
100.1 orally.  Blood pressure is stable, but lower than earlier.  She is very
tired, wants to nap, but moaning and grimacing while lying in bed. Has
difficulty articulating where her pain is, but did state abdominal pain,
headache.  Given 25 fentanyl for abdominal pain as well as 650 mg Tylenol.
Resting quietly in darkened room with daughter at bedside.  IVF continue to
infuse as before at 125 cc/hour.
Atrial fibrillation by telemetry, 125-130 bpm at rest.

## 2023-07-20 NOTE — NUR
PT stated she was  extremely tired, and wanted to get back into bed.  Slightly
disoriented, but assisted easily back to bed.

## 2023-07-20 NOTE — NUR
Bedside report received from EUNICE Acevedo at approx 0700.  Pt is awake, alert,
and confused.  Able to state her name and birthday, and where she is.  Unable
to articulate neither the present date, season, nor reason for admission;
however, she clearly states that her lap roxanne was May 12 of this year.  She
is cooperative and appears not to be anxious.
States she has some nausea. NO vomiting.  Assisted up to recliner chair with
chair alarm in place.  States she feels more comfortable in that position.
NOted she was given zofran earlier this morning, around 4 am.
Lactated Ringer infusion at 125 cc/hour noted ongoing.  She has no c/o pain or
other discomfort.

## 2023-07-20 NOTE — NUR
Pt mentioned that she was concerned about her  alone.  Phone call made
to him to update him.  Daughter Mariana just arrived.

## 2023-07-20 NOTE — NUR
Call to Dr Valentin to update her on pt condition.  New orders received, others
anticipated shortly.

## 2023-07-20 NOTE — NUR
SHIFT SUMMARY
PATIENT ARRIVED TO PCU 12 VIA STRETCHER, SLIDE TRANSFER COMPLETED. PATIENT IS
ALERT AND ORIENTED X 2-3, CONFUSED ON DATE AND CURRENT SITUATION, FORGETFUL AT
TIMES. PATIENT IS PLEASANT AND COOPORATIVE WITH CARE. PATIENT'S ABDOMEN IS
FIRM AND DISTENDED BUT PATIENT DENIES ANY PAIN OR TENDERNESS, BOWEL TONES
ACTIVE. PATIENT MEDICATED PER EMAR FOR NAUSEA. PATIENT HAD SEVERAL SMALL,
LOOSE BOWEL MOVEMENTS, SOME BRIGHT RED DROPS NOTED ON TOP. PATIENT DENIES
CHEST PAIN OR SHORTNESS OF BREATH. ON ROOM AIR, VITAL SIGNS STABLE. PATIENT
EDUCATED ON FIRE SAFETY AND IGNITION SOURCES IN THE HOSPITAL. WILL CONTINUE TO
MONITOR. CALL LIGHT WITHIN REACH.

## 2023-07-20 NOTE — NUR
Pt awakened, cheerful, and states that her headache is gone and her abdominal
pain is only "slight".  She is talkative with her two visitors at the bedside.
Asked for some 7-UP.  Heart rate is 101-113 bpm, atrial fibrillation, at this
time.

## 2023-07-21 VITALS — DIASTOLIC BLOOD PRESSURE: 79 MMHG | SYSTOLIC BLOOD PRESSURE: 121 MMHG

## 2023-07-21 VITALS — DIASTOLIC BLOOD PRESSURE: 87 MMHG | SYSTOLIC BLOOD PRESSURE: 149 MMHG

## 2023-07-21 VITALS — DIASTOLIC BLOOD PRESSURE: 91 MMHG | SYSTOLIC BLOOD PRESSURE: 156 MMHG

## 2023-07-21 VITALS — DIASTOLIC BLOOD PRESSURE: 74 MMHG | SYSTOLIC BLOOD PRESSURE: 148 MMHG

## 2023-07-21 VITALS — SYSTOLIC BLOOD PRESSURE: 149 MMHG | DIASTOLIC BLOOD PRESSURE: 105 MMHG

## 2023-07-21 VITALS — DIASTOLIC BLOOD PRESSURE: 95 MMHG | SYSTOLIC BLOOD PRESSURE: 127 MMHG

## 2023-07-21 LAB
ALBUMIN SERPL BCP-MCNC: 2.9 G/DL (ref 3.4–5)
ALBUMIN/GLOB SERPL: 0.8 {RATIO} (ref 0.8–1.8)
ALT SERPL W P-5'-P-CCNC: 320 U/L (ref 12–78)
ANION GAP SERPL CALCULATED.4IONS-SCNC: 7 MMOL/L (ref 6–16)
ANTI-DSDNA ANTIBODIES: <1 IU/ML (ref 0–9)
AST SERPL W P-5'-P-CCNC: 181 U/L (ref 12–37)
BASOPHILS # BLD AUTO: 0.03 K/MM3 (ref 0–0.23)
BASOPHILS NFR BLD AUTO: 0 % (ref 0–2)
BILIRUB SERPL-MCNC: 6.3 MG/DL (ref 0.1–1)
BUN SERPL-MCNC: 20 MG/DL (ref 8–24)
C3 SERPL-MCNC: 118 MG/DL (ref 82–167)
CALCIUM SERPL-MCNC: 8.4 MG/DL (ref 8.5–10.1)
CHLORIDE SERPL-SCNC: 112 MMOL/L (ref 98–108)
CO2 SERPL-SCNC: 23 MMOL/L (ref 21–32)
CREAT SERPL-MCNC: 0.96 MG/DL (ref 0.4–1)
DEPRECATED RDW RBC AUTO: 49.4 FL (ref 35.1–46.3)
ENA RNP AB SER-ACNC: 0.2 AI (ref 0–0.9)
ENA SM AB SER-ACNC: <0.2 AI (ref 0–0.9)
ENA SS-A AB SER-ACNC: <0.2 AI (ref 0–0.9)
ENA SS-B AB SER-ACNC: <0.2 AI (ref 0–0.9)
EOSINOPHIL # BLD AUTO: 0.16 K/MM3 (ref 0–0.68)
EOSINOPHIL NFR BLD AUTO: 2 % (ref 0–6)
ERYTHROCYTE [DISTWIDTH] IN BLOOD BY AUTOMATED COUNT: 16 % (ref 11.7–14.2)
GLOBULIN SER CALC-MCNC: 3.7 G/DL (ref 2.2–4)
GLUCOSE SERPL-MCNC: 147 MG/DL (ref 70–99)
HCT VFR BLD AUTO: 33 % (ref 33–51)
HGB BLD-MCNC: 10.8 G/DL (ref 11.5–16)
IMM GRANULOCYTES # BLD AUTO: 0.07 K/MM3 (ref 0–0.1)
IMM GRANULOCYTES NFR BLD AUTO: 1 % (ref 0–1)
LYMPHOCYTES # BLD AUTO: 1.12 K/MM3 (ref 0.84–5.2)
LYMPHOCYTES NFR BLD AUTO: 13 % (ref 21–46)
MCHC RBC AUTO-ENTMCNC: 32.7 G/DL (ref 31.5–36.5)
MCV RBC AUTO: 86 FL (ref 80–100)
MONOCYTES # BLD AUTO: 0.91 K/MM3 (ref 0.16–1.47)
MONOCYTES NFR BLD AUTO: 10 % (ref 4–13)
NEUTROPHILS # BLD AUTO: 6.59 K/MM3 (ref 1.96–9.15)
NEUTROPHILS NFR BLD AUTO: 74 % (ref 41–73)
NRBC # BLD AUTO: 0 K/MM3 (ref 0–0.02)
NRBC BLD AUTO-RTO: 0 /100 WBC (ref 0–0.2)
PLATELET # BLD AUTO: 102 K/MM3 (ref 150–400)
POTASSIUM SERPL-SCNC: 3.7 MMOL/L (ref 3.5–5.5)
PROT SERPL-MCNC: 6.6 G/DL (ref 6.4–8.2)
SODIUM SERPL-SCNC: 142 MMOL/L (ref 136–145)

## 2023-07-21 NOTE — NUR
ASSUMED CARE:
 
ASSUMED CARE OF PT APPROX 0715. PT A&O X3-4, SITTING UP IN BED. 'S, SBP
150'S. PT DENIES CP/PRESSURE. SPO2 95% ON RA, PT REPORTS FEELING SOB. HOB
ELEVATED, PT REPORTS RELIEF. PT DENIES NAUSEA AT THIS TIME. DAUGHTER YARA AT
BEDSIDE THIS AM. CALL LIGHT WITHIN REACH. NO FURTHER NEEDS AT THIS TIME.

## 2023-07-21 NOTE — NUR
Pt states she did not sleep very well at all last night.  She appears tired,
lying in bed on her left side.
States no appetite, ongoing nausea.  States very bad sore throat and dry
mouth.  Given a popscicle which she is eating now for relief.

## 2023-07-21 NOTE — NUR
SHIFT SUMMARY
PATIENT ALERT AND OREINTED X 2-3. PATIENT REMAINS SLIGHTLY CONFUSED AND
FORGETFUL, HAVING TO BE REORIENTED TO TASKS AT HAND AND EQUIPMENT IN THE ROOM.
PATIENT MEDICATED PER EMAR FOR NAUSEA. PATIENT WAS AWAKE MUCH OF THE NIGHT
STATING THAT SHE FELT SICK TO HER STOMACH, PATIENT DENIED HAVING ANY PAIN.
PATIENT ON ROOM AIR. VITAL SIGNS STABLE. PATIENT EDUCATED ON FIRE SAFETY AND
IGNITION RISK IN THE HOSPITAL. WILL CONTINUE TO MONITOR. CALL LIGHT WITHIN
REACH.

## 2023-07-21 NOTE — NUR
States that the popscicle really helped her sore throat.  Drank 100cc apple
juice, states that she is feeling okay.  Daughter Mariana at the bedside.

## 2023-07-21 NOTE — NUR
SAFETY NOTE:
PT EDUCATED ON FIRE SAFETY AND IGNITION SOURCES AT THE HOSPITAL. PT VOICED
UNDERSTANDING OF EDUCATION.

## 2023-07-21 NOTE — NUR
Call to Dr. Valentin regarding pt's c/o wheezing and increased work of
breathing. NO distress, but noted transient wheezing and crackles. No
peripheral edema noted on the legs; however, pt states that her rings on her
fingers are tight.  IVF were paused and called the doctor.  Pt was also given
an incentive spirometer, and demonstrated the use of it.
New order to dc the continueous fluids.  Pt is eating and drinking.

## 2023-07-21 NOTE — NUR
SHIFT SUMMARY:
 
PT MOSTLY A&OX4 THROUGHOUT SHIFT. ABLE TO CALL APPROPRIATELY AND MAKE NEEDS
KNOWN TO STAFF. HR 90'S-110'S, BP STABLE. PT DENIES CP/PRESSURE. SPO2 MOSTLY
92-95%. SEE PREVIOUS NOTE REGARDING RESPIRATORY CHANGES THIS SHIFT. ZOSYN
INFUSING IN LAC IV PER EMAR. ABLE TO AMBULATE WITH STAFF ASSISTANCE TO
RESTROOM. PT ABLE TO EAT APPLESAUCE AND BAKED POTATO THIS SHIFT. REPORTS
SLIGHT INCREASE IN APPETITE, DENIES NAUSEA. PT REPORTS HEADACHE THIS PM,
MEDICATED WITH TYLENOL PER EMAR. PT RESTING IN BED. CALL LIGHT WITHIN REACH,
NO FURTHER NEEDS AT THIS TIME. WILL REPORT TO ONCOMING RN.

## 2023-07-22 VITALS — SYSTOLIC BLOOD PRESSURE: 112 MMHG | DIASTOLIC BLOOD PRESSURE: 64 MMHG

## 2023-07-22 VITALS — DIASTOLIC BLOOD PRESSURE: 82 MMHG | SYSTOLIC BLOOD PRESSURE: 134 MMHG

## 2023-07-22 VITALS — SYSTOLIC BLOOD PRESSURE: 115 MMHG | DIASTOLIC BLOOD PRESSURE: 78 MMHG

## 2023-07-22 VITALS — SYSTOLIC BLOOD PRESSURE: 135 MMHG | DIASTOLIC BLOOD PRESSURE: 78 MMHG

## 2023-07-22 VITALS — SYSTOLIC BLOOD PRESSURE: 119 MMHG | DIASTOLIC BLOOD PRESSURE: 87 MMHG

## 2023-07-22 VITALS — DIASTOLIC BLOOD PRESSURE: 95 MMHG | SYSTOLIC BLOOD PRESSURE: 142 MMHG

## 2023-07-22 LAB
ALBUMIN SERPL BCP-MCNC: 2.5 G/DL (ref 3.4–5)
ALBUMIN/GLOB SERPL: 0.7 {RATIO} (ref 0.8–1.8)
ALT SERPL W P-5'-P-CCNC: 225 U/L (ref 12–78)
ANION GAP SERPL CALCULATED.4IONS-SCNC: 5 MMOL/L (ref 6–16)
AST SERPL W P-5'-P-CCNC: 121 U/L (ref 12–37)
BILIRUB SERPL-MCNC: 7.2 MG/DL (ref 0.1–1)
BUN SERPL-MCNC: 14 MG/DL (ref 8–24)
CALCIUM SERPL-MCNC: 8.1 MG/DL (ref 8.5–10.1)
CHLORIDE SERPL-SCNC: 110 MMOL/L (ref 98–108)
CO2 SERPL-SCNC: 27 MMOL/L (ref 21–32)
CREAT SERPL-MCNC: 1.03 MG/DL (ref 0.4–1)
GLOBULIN SER CALC-MCNC: 3.4 G/DL (ref 2.2–4)
GLUCOSE SERPL-MCNC: 99 MG/DL (ref 70–99)
POTASSIUM SERPL-SCNC: 3.1 MMOL/L (ref 3.5–5.5)
PROT SERPL-MCNC: 5.9 G/DL (ref 6.4–8.2)
SODIUM SERPL-SCNC: 142 MMOL/L (ref 136–145)
TSH SERPL DL<=0.005 MIU/L-ACNC: 3.21 UIU/ML (ref 0.36–4.8)

## 2023-07-22 NOTE — NUR
SAFETY
 
THIS RN PROVIDED EDUCATION AND ASSESSED FOR FIRE IGNITION AND SOURCES. PATIENT
VERBALIZED UNDERSTANDING, AND DENIED HAVING ANY FIRE IGNITION SOURCES OR BEING
A SMOKER.

## 2023-07-22 NOTE — NUR
shift summary
 
patient neuro remains intact and unchanged. vital signs stable. no acute
changes this shift. plan of care remains up to date. patient has been pain
free, chest pain/pressure free, and shortness of breath free. patient had a
shower today with minimal assistance. patient has had family off and on at
bedside. call light within reach

## 2023-07-22 NOTE — NUR
CARE ASSUMPTION
 
This Rn assumed care at 0700. vital signs stable. tele afib 106. spo2 >95% on
room air. patient is alert and oriented x4. perrla. neuro intact. patient uses
call light appropriately and is able to make needs knonw. patient reports no
shortness of breath, pain, or chest pain/pressure. lung sounds bilaterally
upper and lower are clear. patient ate breakfast this morning and tolerated it
well. see shift assessment for further detials. plan of care is up to date.
call light within reach. family at bedside.

## 2023-07-22 NOTE — NUR
SHIFT SUMMARY
 
PT A&O X3-4. PT ANSWERING QUESTIONS APPROPRIATELY AND PLEASANT. VSS THROUGHOUT
SHIFT. PT REMAINS ON RA, SPO2 93-95%. DENIES SOB. DENIES CP OR PRESSURE. PT
DENIES GENERAL PAIN. PT DENIES N/V/D. REPORTS BM YESTERDAY DURING DAYSHIFT WAS
"A LITTLE LOOSE" BUT DENIES ANY CONCERNS. PT DENIES ABDOMINAL PAIN. PT REPORTS
"SHE IS FEELING MUCH BETTER". PT SLEPT WELL THROUGHOUT THE NIGHT. PT UP TO USE
RESTROOM W/SBA. PT TOLERATED THIS WELL. NO BM THIS SHIFT. NO ACUTE CHANGES
DURING SHIFT. WILL UPDATE ONCOMING RN.

## 2023-07-23 VITALS — SYSTOLIC BLOOD PRESSURE: 162 MMHG | DIASTOLIC BLOOD PRESSURE: 109 MMHG

## 2023-07-23 VITALS — SYSTOLIC BLOOD PRESSURE: 132 MMHG | DIASTOLIC BLOOD PRESSURE: 82 MMHG

## 2023-07-23 VITALS — SYSTOLIC BLOOD PRESSURE: 150 MMHG | DIASTOLIC BLOOD PRESSURE: 96 MMHG

## 2023-07-23 VITALS — SYSTOLIC BLOOD PRESSURE: 148 MMHG | DIASTOLIC BLOOD PRESSURE: 116 MMHG

## 2023-07-23 VITALS — DIASTOLIC BLOOD PRESSURE: 101 MMHG | SYSTOLIC BLOOD PRESSURE: 152 MMHG

## 2023-07-23 VITALS — DIASTOLIC BLOOD PRESSURE: 103 MMHG | SYSTOLIC BLOOD PRESSURE: 142 MMHG

## 2023-07-23 VITALS — SYSTOLIC BLOOD PRESSURE: 150 MMHG | DIASTOLIC BLOOD PRESSURE: 101 MMHG

## 2023-07-23 VITALS — SYSTOLIC BLOOD PRESSURE: 132 MMHG | DIASTOLIC BLOOD PRESSURE: 80 MMHG

## 2023-07-23 LAB
ALBUMIN SERPL BCP-MCNC: 2.3 G/DL (ref 3.4–5)
ALBUMIN/GLOB SERPL: 0.6 {RATIO} (ref 0.8–1.8)
ALT SERPL W P-5'-P-CCNC: 203 U/L (ref 12–78)
ANION GAP SERPL CALCULATED.4IONS-SCNC: 6 MMOL/L (ref 6–16)
AST SERPL W P-5'-P-CCNC: 131 U/L (ref 12–37)
BASOPHILS # BLD AUTO: 0.05 K/MM3 (ref 0–0.23)
BASOPHILS NFR BLD AUTO: 1 % (ref 0–2)
BILIRUB SERPL-MCNC: 6.3 MG/DL (ref 0.1–1)
BUN SERPL-MCNC: 14 MG/DL (ref 8–24)
CALCIUM SERPL-MCNC: 8.2 MG/DL (ref 8.5–10.1)
CHLORIDE SERPL-SCNC: 109 MMOL/L (ref 98–108)
CO2 SERPL-SCNC: 26 MMOL/L (ref 21–32)
CREAT SERPL-MCNC: 0.93 MG/DL (ref 0.4–1)
DEPRECATED RDW RBC AUTO: 48.5 FL (ref 35.1–46.3)
EOSINOPHIL # BLD AUTO: 0.24 K/MM3 (ref 0–0.68)
EOSINOPHIL NFR BLD AUTO: 4 % (ref 0–6)
ERYTHROCYTE [DISTWIDTH] IN BLOOD BY AUTOMATED COUNT: 15.9 % (ref 11.7–14.2)
GLOBULIN SER CALC-MCNC: 3.6 G/DL (ref 2.2–4)
GLUCOSE SERPL-MCNC: 105 MG/DL (ref 70–99)
HCT VFR BLD AUTO: 29.1 % (ref 33–51)
HGB BLD-MCNC: 9.6 G/DL (ref 11.5–16)
IMM GRANULOCYTES # BLD AUTO: 0.27 K/MM3 (ref 0–0.1)
IMM GRANULOCYTES NFR BLD AUTO: 5 % (ref 0–1)
LYMPHOCYTES # BLD AUTO: 1.13 K/MM3 (ref 0.84–5.2)
LYMPHOCYTES NFR BLD AUTO: 19 % (ref 21–46)
MCHC RBC AUTO-ENTMCNC: 33 G/DL (ref 31.5–36.5)
MCV RBC AUTO: 84 FL (ref 80–100)
MONOCYTES # BLD AUTO: 0.7 K/MM3 (ref 0.16–1.47)
MONOCYTES NFR BLD AUTO: 12 % (ref 4–13)
NEUTROPHILS # BLD AUTO: 3.52 K/MM3 (ref 1.96–9.15)
NEUTROPHILS NFR BLD AUTO: 60 % (ref 41–73)
NRBC # BLD AUTO: 0 K/MM3 (ref 0–0.02)
NRBC BLD AUTO-RTO: 0 /100 WBC (ref 0–0.2)
PLATELET # BLD AUTO: 130 K/MM3 (ref 150–400)
POTASSIUM SERPL-SCNC: 3.5 MMOL/L (ref 3.5–5.5)
PROT SERPL-MCNC: 5.9 G/DL (ref 6.4–8.2)
SODIUM SERPL-SCNC: 141 MMOL/L (ref 136–145)

## 2023-07-23 NOTE — NUR
NOC SHIFT SUMMARY
 
PT SLEPT WELL, NO COMPLAINTS OF PAIN OR DISCOMFORT. VSS PER PT TREND, ON RA.
AFIB ON TELEMETRY, LOW 100S. 1PA TO BATHROOM.
 
WILL PASS ON TO DAY RN

## 2023-07-23 NOTE — NUR
NIGHT SHIFT SUMMARY:
A&Ox4. PLEASANT AND COOPERATIVE WITH CARE. TELE AFIB. AROUND 1430 BEGAN C/O
SOB; HOB ELEVATED. O2 SATS 96% RA, ELEVATED DBP . UNABLE TO REACH DR SAHU; APPLIED 2L/MIN VIA NC AND PERFORMED EKG, MOSTLY UNCHANGED FROM
YESTERDAY. NO FURTHER C/O SOB. DENIES CP, DIZZINESS OR NAUSEA. INDEPENDENT TO
BATHROOM. SCDs IN PLACE. NO CONCERNS. REPORT TO ONCOMING RN.

## 2023-07-23 NOTE — NUR
PT ARRIVED TO MEDICAL FLOOR AT 1140 ACCOMPANIED BY DAUGHTER AND TWO SONS. IV
L AC c ZOSYN AND NS. PLEASANT AND COOPERATIVE TO CARE. A&Ox4. SWELLING NOTED
IN LEs; R>L. REQUESTED TO REMOVE YELLOW  SOCKS. INITIAL BP ELEVATED;
MANUAL RECHECK /82. GIVEN ICE WATER AND LUNCH.

## 2023-07-23 NOTE — NUR
PT CALLED WITH C/O SUDDEN ONSET SOB. SPO2 96%. TELE AFIB. /110. 2L/MIN
VIA NC APPLIED AND HELPED CALM PATIENT AND IMPROVE BREATHING. BEDSIDE EKG
OBTAINED; NO DEGREDATION COMPARED TO PREVIOUS STUDY. PT RESTING NOW AND
REPORTS FEELING MUCH BETTER.

## 2023-07-23 NOTE — NUR
THIS NURSE GAVE REPORT TO PARAM DAWSON ON MEDICAL. PARAM DAWSON HAD NO FURTHER
QUESTIONS AT THIS TIME AFTER REPORT. THIS NURSE WILL PACK UP THE PATIENTS
PERSONAL ITEMS IN THE ROOM AND TRASNFER HER UP TO MEDICAL SHORTLY.

## 2023-07-24 VITALS — DIASTOLIC BLOOD PRESSURE: 92 MMHG | SYSTOLIC BLOOD PRESSURE: 161 MMHG

## 2023-07-24 VITALS — SYSTOLIC BLOOD PRESSURE: 172 MMHG | DIASTOLIC BLOOD PRESSURE: 113 MMHG

## 2023-07-24 VITALS — SYSTOLIC BLOOD PRESSURE: 168 MMHG | DIASTOLIC BLOOD PRESSURE: 101 MMHG

## 2023-07-24 VITALS — DIASTOLIC BLOOD PRESSURE: 96 MMHG | SYSTOLIC BLOOD PRESSURE: 150 MMHG

## 2023-07-24 VITALS — DIASTOLIC BLOOD PRESSURE: 88 MMHG | SYSTOLIC BLOOD PRESSURE: 152 MMHG

## 2023-07-24 VITALS — DIASTOLIC BLOOD PRESSURE: 88 MMHG | SYSTOLIC BLOOD PRESSURE: 154 MMHG

## 2023-07-24 LAB
ALBUMIN SERPL BCP-MCNC: 2.4 G/DL (ref 3.4–5)
ALBUMIN/GLOB SERPL: 0.6 {RATIO} (ref 0.8–1.8)
ALT SERPL W P-5'-P-CCNC: 179 U/L (ref 12–78)
ANION GAP SERPL CALCULATED.4IONS-SCNC: 6 MMOL/L (ref 6–16)
AST SERPL W P-5'-P-CCNC: 125 U/L (ref 12–37)
BILIRUB SERPL-MCNC: 4.8 MG/DL (ref 0.1–1)
BUN SERPL-MCNC: 13 MG/DL (ref 8–24)
CALCIUM SERPL-MCNC: 8.8 MG/DL (ref 8.5–10.1)
CHLORIDE SERPL-SCNC: 107 MMOL/L (ref 98–108)
CO2 SERPL-SCNC: 27 MMOL/L (ref 21–32)
CREAT SERPL-MCNC: 0.88 MG/DL (ref 0.4–1)
DEPRECATED RDW RBC AUTO: 50 FL (ref 35.1–46.3)
ERYTHROCYTE [DISTWIDTH] IN BLOOD BY AUTOMATED COUNT: 16.3 % (ref 11.7–14.2)
GLOBULIN SER CALC-MCNC: 3.8 G/DL (ref 2.2–4)
GLUCOSE SERPL-MCNC: 102 MG/DL (ref 70–99)
HCT VFR BLD AUTO: 29.6 % (ref 33–51)
HGB BLD-MCNC: 9.7 G/DL (ref 11.5–16)
MCHC RBC AUTO-ENTMCNC: 32.8 G/DL (ref 31.5–36.5)
MCV RBC AUTO: 85 FL (ref 80–100)
NRBC # BLD AUTO: 0.02 K/MM3 (ref 0–0.02)
NRBC BLD AUTO-RTO: 0.3 /100 WBC (ref 0–0.2)
PLATELET # BLD AUTO: 150 K/MM3 (ref 150–400)
POTASSIUM SERPL-SCNC: 3.7 MMOL/L (ref 3.5–5.5)
PROT SERPL-MCNC: 6.2 G/DL (ref 6.4–8.2)
SODIUM SERPL-SCNC: 140 MMOL/L (ref 136–145)

## 2023-07-24 NOTE — NUR
SHIFT SUMMARY
PT IN CHAIR OR LAYING DOWN TODAY.  DID AMBULATE IN HALLWAY WITH FAMILY USING
FWW THIS MORNING.  TOOK A SPONGE BATH THIS AFTERNOON.  BLOOD PRESSURES
ELEVATED WITH MD AWARE AND ADJUSTING MEDICATION.  FAMILY AT BEDSIDE THROUGH
THE DAY.  DENIES ANY PAIN TO ABDOMEN OR OTHERWISE BUT DOES REPORT FEELING
BLOATED ESPECIALLY AFTER EATING.

## 2023-07-24 NOTE — NUR
SHIFT SUMMARY;
 
NO ACUTE CHANGES OVERNIGHT. THE PT IS AXO X4 AND A STANDBY ASSIST TO THE
BATHROOM. THE PT IS ON TELE, A-FIB IN 'S. THE PTS BLOOD PRESSURE HAS
BEEN SLIGHTLY ELEVATED BUT NOT ELEVATED ENOUGH TO RECIEVE PRN LABETALOL. THE
PT DENIES ANY SOB, CHEST PAIN/PRESSURE OR PAIN AT THIS TIME. THE PT IS ON 2L
NC W/ O2 SATS >92%. THE PT DOES ENDORSE FEELING BLOATED. CURRENTLY THE PT IS
SLEEPING IN BED WITH THE BED IN THE LOWEST POSITION AND THE CALL LIGHT AT
BEDSIDE.
 
FIRE SAFETY MAINTAINED T/O THE SHIFT.

## 2023-07-24 NOTE — NUR
THE PATIENT HAS SLEPT MOST OF THE SHIFT, WAKING UP TO USE THE BEDSIDE COMODE
(TWICE), THE PATIENT RECEIVED A VISIT FROM DR. PACE AND WAS TOLD ABOUT HER
INFECTION. THE PATIENT HAS DRANK TWO COLD DRINKS AND ATE VERY LITTLE. THE
PATIENT IS A&O X4, WHEN AWAKE, BUT WILL FALL ASLEEP AS SOON AS THE
CONVERSATION IS STOPPED.

## 2023-07-25 VITALS — SYSTOLIC BLOOD PRESSURE: 152 MMHG | DIASTOLIC BLOOD PRESSURE: 102 MMHG

## 2023-07-25 VITALS — DIASTOLIC BLOOD PRESSURE: 88 MMHG | SYSTOLIC BLOOD PRESSURE: 155 MMHG

## 2023-07-25 LAB
ALBUMIN SERPL BCP-MCNC: 2.5 G/DL (ref 3.4–5)
ALBUMIN/GLOB SERPL: 0.6 {RATIO} (ref 0.8–1.8)
ALT SERPL W P-5'-P-CCNC: 149 U/L (ref 12–78)
ANION GAP SERPL CALCULATED.4IONS-SCNC: 8 MMOL/L (ref 6–16)
AST SERPL W P-5'-P-CCNC: 116 U/L (ref 12–37)
BILIRUB SERPL-MCNC: 3.4 MG/DL (ref 0.1–1)
BUN SERPL-MCNC: 12 MG/DL (ref 8–24)
CALCIUM SERPL-MCNC: 8.6 MG/DL (ref 8.5–10.1)
CHLORIDE SERPL-SCNC: 105 MMOL/L (ref 98–108)
CO2 SERPL-SCNC: 27 MMOL/L (ref 21–32)
CREAT SERPL-MCNC: 0.88 MG/DL (ref 0.4–1)
GLOBULIN SER CALC-MCNC: 3.9 G/DL (ref 2.2–4)
GLUCOSE SERPL-MCNC: 111 MG/DL (ref 70–99)
POTASSIUM SERPL-SCNC: 3.6 MMOL/L (ref 3.5–5.5)
PROT SERPL-MCNC: 6.4 G/DL (ref 6.4–8.2)
SODIUM SERPL-SCNC: 140 MMOL/L (ref 136–145)

## 2023-07-25 NOTE — NUR
THE PATIENT WAS DISCHARGED HOME WITH HER FAMILY, AFTER DICHARGE INSTRUCTIONS
WERE AND DISCUSSED WITH THE PATIENT AND HER FAMILY AND GIVEN TO THE PATIENT.
THE PATIENT IS SCHEDULED FOR ABX INFUSIONS AT THE [facility], STARTING
TOMORROW. THE PATIENT WAS SENT HOME WITH HER IV INTACT AND IN PLACE. THE
PATIENT WAS WHEELED OUT OF THE HOSPITAL VIA WHEELCHAIR.

## 2023-07-25 NOTE — NUR
SHIFT SUMMARY
A/OX4, SBA TO BATHROOM. SPO2 >92% ON RA. TELE AFIB 90S, DENIES CHEST
PAIN/PRESSURE. IV ABX GIVEN PER EMAR. VSS, NO ACUTE CHANGES AT THIS TIME. BED
IN LOWEST POSITION WITH CALL LIGHT IN REACH. WILL CONTINUE TO MONITOR AND
REPORT TO ONCOMING RN.

## 2023-07-26 ENCOUNTER — HOSPITAL ENCOUNTER (OUTPATIENT)
Dept: HOSPITAL 95 - ATC | Age: 78
Discharge: HOME | End: 2023-07-26
Attending: HOSPITALIST
Payer: MEDICARE

## 2023-07-26 VITALS — DIASTOLIC BLOOD PRESSURE: 69 MMHG | SYSTOLIC BLOOD PRESSURE: 122 MMHG

## 2023-07-26 DIAGNOSIS — I48.91: ICD-10-CM

## 2023-07-26 DIAGNOSIS — E89.0: ICD-10-CM

## 2023-07-26 DIAGNOSIS — M10.9: ICD-10-CM

## 2023-07-26 DIAGNOSIS — R78.81: Primary | ICD-10-CM

## 2023-07-26 DIAGNOSIS — Z88.1: ICD-10-CM

## 2023-07-26 DIAGNOSIS — E03.9: ICD-10-CM

## 2023-07-26 DIAGNOSIS — I10: ICD-10-CM

## 2023-07-26 DIAGNOSIS — K21.9: ICD-10-CM

## 2023-07-26 DIAGNOSIS — E83.42: ICD-10-CM

## 2023-07-26 DIAGNOSIS — E78.1: ICD-10-CM

## 2023-07-27 ENCOUNTER — HOSPITAL ENCOUNTER (OUTPATIENT)
Dept: HOSPITAL 95 - ATC | Age: 78
Discharge: HOME | End: 2023-07-27
Attending: HOSPITALIST
Payer: MEDICARE

## 2023-07-27 VITALS — SYSTOLIC BLOOD PRESSURE: 139 MMHG | DIASTOLIC BLOOD PRESSURE: 84 MMHG

## 2023-07-27 DIAGNOSIS — R78.81: Primary | ICD-10-CM

## 2023-07-28 ENCOUNTER — HOSPITAL ENCOUNTER (OUTPATIENT)
Dept: HOSPITAL 95 - ATC | Age: 78
Discharge: HOME | End: 2023-07-28
Attending: HOSPITALIST
Payer: MEDICARE

## 2023-07-28 VITALS — DIASTOLIC BLOOD PRESSURE: 68 MMHG | SYSTOLIC BLOOD PRESSURE: 125 MMHG

## 2023-07-28 DIAGNOSIS — R78.81: Primary | ICD-10-CM

## 2023-07-28 DIAGNOSIS — I48.91: ICD-10-CM

## 2023-07-28 DIAGNOSIS — I10: ICD-10-CM

## 2023-07-28 DIAGNOSIS — M10.9: ICD-10-CM

## 2023-07-28 DIAGNOSIS — K21.9: ICD-10-CM

## 2023-07-28 DIAGNOSIS — Z88.8: ICD-10-CM

## 2023-07-28 DIAGNOSIS — Z88.1: ICD-10-CM

## 2023-07-28 DIAGNOSIS — E03.9: ICD-10-CM

## 2023-07-29 ENCOUNTER — HOSPITAL ENCOUNTER (OUTPATIENT)
Dept: HOSPITAL 95 - ATC | Age: 78
Discharge: HOME | End: 2023-07-29
Attending: HOSPITALIST
Payer: MEDICARE

## 2023-07-29 VITALS — DIASTOLIC BLOOD PRESSURE: 76 MMHG | SYSTOLIC BLOOD PRESSURE: 119 MMHG

## 2023-07-29 DIAGNOSIS — K21.9: ICD-10-CM

## 2023-07-29 DIAGNOSIS — Z79.890: ICD-10-CM

## 2023-07-29 DIAGNOSIS — Z79.899: ICD-10-CM

## 2023-07-29 DIAGNOSIS — R78.81: Primary | ICD-10-CM

## 2023-07-29 DIAGNOSIS — Z88.8: ICD-10-CM

## 2023-07-29 DIAGNOSIS — I10: ICD-10-CM

## 2023-07-29 DIAGNOSIS — Z88.1: ICD-10-CM

## 2023-07-29 DIAGNOSIS — E03.9: ICD-10-CM

## 2023-07-29 DIAGNOSIS — I48.91: ICD-10-CM

## 2024-11-26 ENCOUNTER — HOSPITAL ENCOUNTER (OUTPATIENT)
Dept: HOSPITAL 95 - LAB SHORT | Age: 79
Discharge: HOME | End: 2024-11-26
Attending: INTERNAL MEDICINE
Payer: MEDICARE

## 2024-11-26 DIAGNOSIS — N39.0: Primary | ICD-10-CM

## 2024-11-26 LAB
LEUKOCYTE ESTERASE UR QL STRIP: (no result)
PROT UR STRIP-MCNC: (no result) MG/DL
RBC #/AREA URNS HPF: (no result) /HPF (ref 0–2)
SP GR SPEC: 1.01 (ref 1–1.02)
UROBILINOGEN UR STRIP-MCNC: (no result) MG/DL
WBC #/AREA URNS HPF: (no result) /HPF (ref 0–5)

## 2024-12-19 ENCOUNTER — HOSPITAL ENCOUNTER (OUTPATIENT)
Dept: HOSPITAL 95 - LAB SHORT | Age: 79
End: 2024-12-19
Attending: INTERNAL MEDICINE
Payer: MEDICARE

## 2024-12-19 DIAGNOSIS — N39.0: Primary | ICD-10-CM

## 2024-12-19 LAB
LEUKOCYTE ESTERASE UR QL STRIP: (no result)
PROT UR STRIP-MCNC: (no result) MG/DL
RBC #/AREA URNS HPF: (no result) /HPF (ref 0–2)
SP GR SPEC: 1.02 (ref 1–1.02)
UROBILINOGEN UR STRIP-MCNC: (no result) MG/DL
WBC #/AREA URNS HPF: (no result) /HPF (ref 0–5)

## 2025-01-02 ENCOUNTER — HOSPITAL ENCOUNTER (OUTPATIENT)
Dept: HOSPITAL 95 - LAB SHORT | Age: 80
End: 2025-01-02
Attending: INTERNAL MEDICINE
Payer: MEDICARE

## 2025-01-02 DIAGNOSIS — N39.0: Primary | ICD-10-CM

## 2025-01-20 ENCOUNTER — HOSPITAL ENCOUNTER (OUTPATIENT)
Dept: HOSPITAL 95 - LAB | Age: 80
End: 2025-01-20
Attending: INTERNAL MEDICINE
Payer: MEDICARE

## 2025-01-20 DIAGNOSIS — N39.0: Primary | ICD-10-CM

## 2025-01-20 LAB
PROT UR STRIP-MCNC: (no result) MG/DL
RBC #/AREA URNS HPF: (no result) /HPF (ref 0–2)
SP GR SPEC: 1.01 (ref 1–1.02)
UROBILINOGEN UR STRIP-MCNC: (no result) MG/DL
WBC #/AREA URNS HPF: (no result) /HPF (ref 0–5)

## 2025-05-16 ENCOUNTER — HOSPITAL ENCOUNTER (OUTPATIENT)
Dept: HOSPITAL 95 - LAB | Age: 80
End: 2025-05-16
Attending: INTERNAL MEDICINE
Payer: MEDICARE

## 2025-05-16 DIAGNOSIS — N39.0: Primary | ICD-10-CM

## 2025-05-16 LAB
LEUKOCYTE ESTERASE UR QL STRIP: (no result)
PROT UR STRIP-MCNC: (no result) MG/DL
SP GR SPEC: 1.01 (ref 1–1.02)
UROBILINOGEN UR STRIP-MCNC: (no result) MG/DL

## 2025-07-18 ENCOUNTER — HOSPITAL ENCOUNTER (OUTPATIENT)
Dept: HOSPITAL 95 - LAB | Age: 80
Discharge: HOME | End: 2025-07-18
Attending: INTERNAL MEDICINE
Payer: MEDICARE

## 2025-07-18 DIAGNOSIS — N39.0: Primary | ICD-10-CM

## 2025-07-18 LAB
AMORPH SED URNS QL MICRO: (no result)
AMORPH SED URNS QL MICRO: (no result) /LPF
APPEARANCE, URINE: (no result)
BACTERIA URNS QL MICRO: (no result) /HPF
BACTERIAL CASTS [#/AREA] IN URINE SEDIMENT BY MICROSCOPY LOW POWER FIELD: (no result) /LPF
BILIRUB UR QL STRIP: (no result)
CA PHOS CRY #/AREA URNS HPF: (no result) /HPF
CAOX CRY #/AREA URNS HPF: (no result) /HPF
COLOR SPEC: YELLOW
CYSTINE CRY #/AREA URNS LPF: (no result) /HPF
EPI CELLS #/AREA URNS HPF: (no result) /HPF
EPITH CASTS #/AREA URNS LPF: (no result) /LPF
FATTY CASTS #/AREA URNS LPF: (no result) /LPF
GLUCOSE UR-MCNC: (no result) MG/DL
GRAN CASTS #/AREA URNS LPF: (no result) /LPF
KETONES UR STRIP-MCNC: (no result) MG/DL
LEUKOCYTE ESTERASE UR QL STRIP: (no result)
Lab: (no result) /HPF
MICRO URNS: (no result) /HPF
MUCOUS THREADS #/AREA URNS HPF: (no result) /[HPF]
NITRITE UR QL STRIP: (no result)
OVAL FAT BODIES #/AREA URNS HPF: (no result) /LPF
OVAL FAT BODIES #/AREA URNS HPF: (no result) /LPF
PH UR STRIP: 6 [PH] (ref 5–8)
PROT UR STRIP-MCNC: (no result) MG/DL
RBC # UR STRIP: (no result) /UL
RBC #/AREA URNS HPF: (no result) /HPF (ref 0–2)
RBC CASTS #/AREA URNS LPF: (no result) /LPF
RENAL EPI CELLS #/AREA URNS HPF: (no result) /HPF
RENAL EPI CELLS #/AREA URNS HPF: (no result) /LPF
SP GR SPEC: 1.01 (ref 1–1.02)
SPERM URNS QL MICRO: (no result) /HPF
TRANS CELLS #/AREA URNS HPF: (no result) /HPF
TRI-PHOS CRY #/AREA URNS HPF: (no result) /HPF
TRICHOMONAS #/AREA URNS HPF: (no result) /HPF
URATE CRY URNS QL MICRO: (no result) /HPF
URN SPEC COLLECT METH UR: (no result)
UROBILINOGEN UR STRIP-MCNC: (no result) MG/DL
WBC #/AREA URNS HPF: (no result) /HPF (ref 0–5)
WBC CASTS #/AREA URNS LPF: (no result) /LPF
WBC CASTS #/AREA URNS LPF: (no result) /LPF
YEAST LIKE FUNGI #/AREA URNS HPF: (no result) /HPF